# Patient Record
Sex: FEMALE | Race: BLACK OR AFRICAN AMERICAN | NOT HISPANIC OR LATINO | URBAN - METROPOLITAN AREA
[De-identification: names, ages, dates, MRNs, and addresses within clinical notes are randomized per-mention and may not be internally consistent; named-entity substitution may affect disease eponyms.]

---
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
## 2020-07-25 ENCOUNTER — EMERGENCY (EMERGENCY)
Age: 20
LOS: 1 days | Discharge: HOME | End: 2020-07-25
Attending: EMERGENCY MEDICINE | Admitting: EMERGENCY MEDICINE
Payer: COMMERCIAL

## 2020-07-25 VITALS
RESPIRATION RATE: 15 BRPM | TEMPERATURE: 98 F | SYSTOLIC BLOOD PRESSURE: 125 MMHG | DIASTOLIC BLOOD PRESSURE: 67 MMHG | HEART RATE: 98 BPM | OXYGEN SATURATION: 100 %

## 2020-07-25 VITALS
RESPIRATION RATE: 15 BRPM | OXYGEN SATURATION: 98 % | TEMPERATURE: 99 F | HEART RATE: 96 BPM | DIASTOLIC BLOOD PRESSURE: 83 MMHG | SYSTOLIC BLOOD PRESSURE: 124 MMHG

## 2020-07-25 LAB
HIV COMBO RESULT: SIGNIFICANT CHANGE UP
HIV1+2 AB SPEC QL: SIGNIFICANT CHANGE UP

## 2020-07-25 PROCEDURE — 99283 EMERGENCY DEPT VISIT LOW MDM: CPT

## 2020-07-25 RX ORDER — PREDNISOLONE SODIUM PHOSPHATE 1 %
1 DROPS OPHTHALMIC (EYE) ONCE
Refills: 0 | Status: COMPLETED | OUTPATIENT
Start: 2020-07-25 | End: 2020-07-25

## 2020-07-25 RX ORDER — LEVONORGESTREL 1.5 MG/1
1.5 TABLET ORAL ONCE
Refills: 0 | Status: COMPLETED | OUTPATIENT
Start: 2020-07-25 | End: 2020-07-25

## 2020-07-25 RX ORDER — POLYMYXIN B SULF/TRIMETHOPRIM 10000-1/ML
1 DROPS OPHTHALMIC (EYE) ONCE
Refills: 0 | Status: COMPLETED | OUTPATIENT
Start: 2020-07-25 | End: 2020-07-25

## 2020-07-25 RX ORDER — LORATADINE 10 MG/1
10 TABLET ORAL DAILY
Refills: 0 | Status: DISCONTINUED | OUTPATIENT
Start: 2020-07-25 | End: 2020-07-29

## 2020-07-25 RX ADMIN — LORATADINE 10 MILLIGRAM(S): 10 TABLET ORAL at 04:30

## 2020-07-25 RX ADMIN — Medication 1 DROP(S): at 05:37

## 2020-07-25 NOTE — ED PROVIDER NOTE - ATTENDING CONTRIBUTION TO CARE
Dr. Govea:  I have personally performed a face to face bedside history and physical examination of this patient. I have discussed the history, examination, review of systems, assessment and plan of management with the resident. I have reviewed the electronic medical record and amended it to reflect my history, review of systems, physical exam, assessment and plan.    19F denies pmh presents with acute onset right eye redness and irritation that started earlier tonight.  Initially with pain and blurry vision but improved.  States that earlier, her parents also noted right facial rash and mild swelling.  Denies airway involvement, intra-oral lesions.  No contact lens usage.  Denies contact with any new food/substance, no known allergies.    Exam:  - nad  - rrr  - ctab   -abd soft ntnd  - +right eye injected, no discharge, mild eyelid swelling; minimal facial swelling, no notable rash    A/P  - possibly allergic reaction/conjunctivitis  - polytrim, claritin Dr. Govea:  I have personally performed a face to face bedside history and physical examination of this patient. I have discussed the history, examination, review of systems, assessment and plan of management with the resident. I have reviewed the electronic medical record and amended it to reflect my history, review of systems, physical exam, assessment and plan.    19F denies pmh presents with acute onset right eye redness and irritation that started earlier tonight.  Initially with pain and blurry vision but improved.  States that earlier, her parents also noted right facial rash and mild swelling.  Denies airway involvement, intra-oral lesions.  No contact lens usage.  Denies contact with any new food/substance, no known allergies.    Exam:  - nad  - rrr  - ctab   -abd soft ntnd  - +right eye injected, no discharge, mild eyelid swelling; minimal facial swelling, no notable rash    A/P  - possibly allergic reaction/conjunctivitis, likely episcleritis  - polytrim, claritin, NSAIDs Dr. Govea:  I have personally performed a face to face bedside history and physical examination of this patient. I have discussed the history, examination, review of systems, assessment and plan of management with the resident. I have reviewed the electronic medical record and amended it to reflect my history, review of systems, physical exam, assessment and plan.    19F denies pmh presents with acute onset right eye redness and irritation that started earlier tonight.  Initially with pain and blurry vision but improved.  States that earlier, her parents also noted right facial rash and mild swelling.  Denies airway involvement, intra-oral lesions.  No contact lens usage.  Denies contact with any new food/substance, no known allergies.    Exam:  - nad  - rrr  - ctab   -abd soft ntnd  - +right eye injected, no discharge, mild eyelid swelling; minimal facial swelling, no notable rash    A/P  - possibly allergic reaction/conjunctivitis, likely episcleritis  - polytrim, claritin, NSAIDs  - f/u ophthalomology
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
## 2020-07-25 NOTE — ED PROVIDER NOTE - PATIENT PORTAL LINK FT
You can access the FollowMyHealth Patient Portal offered by [facility] by registering at the following website: [email or web address]. By joining [facility]’s FollowMyHealth portal, you will also be able to view your health information using other applications (apps) compatible with our system.

## 2020-07-25 NOTE — ED PEDIATRIC TRIAGE NOTE - CHIEF COMPLAINT QUOTE
Pt. was laying down when she starting having itchy eyes, no other symptoms, no known allergies. MEDARDO, CARMEN.

## 2020-07-25 NOTE — ED ADULT NURSE NOTE - OBJECTIVE STATEMENT
I noticed my right eye was red/itchy and I have some right facial swelling. no difficulty breathing no tongue swelling. speaking in full sentences

## 2020-07-25 NOTE — ED ADULT TRIAGE NOTE - CHIEF COMPLAINT QUOTE
pt reports allergic rxn to unknown allergen. endorses right eye pain and facial redness with nausea. denies sob. speaking in complete sentences

## 2020-07-25 NOTE — ED PROVIDER NOTE - NSFOLLOWUPINSTRUCTIONS_ED_ALL_ED_FT
you were seen in the ED today for possible allergic reaction and eye irritation.   Take Claritin or Zyrtec for allergies.   Polytrim eye drops, 1-2 drops in the R eye you were seen in the ED today for possible allergic reaction and eye irritation.   Take Claritin or Zyrtec for allergies.   Polytrim eye drops, 1-2 drops in the R eye every 3-6 hours for 7 days.   Pred Forte drops 1 drop in the R eye twice a day for 7 days.   Follow up with your primary care doctor.   Return to the emergency department for any new or worsening symptoms.

## 2020-07-25 NOTE — ED PROVIDER NOTE - CLINICAL SUMMARY MEDICAL DECISION MAKING FREE TEXT BOX
20yo F with complaints of possible allergic reaction. R eye with conjunctival injection and irritation. mild edema of the eyelid. no facial swelling or rash noted on exam. possible irritation from foreign body, viral or bacterial conjunctivitis. will treat for conjunctivitis and claritin for allergic component. xiomara

## 2020-07-25 NOTE — ED PROVIDER NOTE - OBJECTIVE STATEMENT
20yo F no PMH presenting for R eye pain. Was laying down when she started having R eye pain, felt like a pebble fell into it. scratched slightly, washed out the eye. initially had some blurry vision which has since resolved. parents noted that she had some swelling around the eye and a bumpy rash on her right cheek. took flonase. no contact use. 20yo F presenting for R eye pain. Was laying down when she started having R eye pain, felt like a pebble fell into it. scratched slightly, washed out the eye. initially had some blurry vision which has since resolved. parents noted that she had some swelling around the eye and a bumpy rash on her right cheek. took flonase. no contact lense use. some nausea when driving to the ED. no vomiting or abd pain. no discharge. denies any other complaints.

## 2020-07-25 NOTE — ED PROVIDER NOTE - EYES, MLM
R eye with conjunctival injection, mild edema of the eyelid. pupils equal, round and reactive to light.

## 2020-07-25 NOTE — ED PROVIDER NOTE - RAPID ASSESSMENT
R eye pain and redness, subsequent swelling.  Now with nausea.  No difficulty breathing, no vomiting, no other new concerns.      Well appearing.  No distress.  Breathing comforatbly.  Redness, clear discharge from R eye.      Gave sign out to the Red Fellow (Hermelindo).  Will let red attending know about patient (Howard).  Stable for transport to [facility] ED.    PMH/PSH: negative  FH/SH: non-contributory, except as noted in the HPI  Allergies: No known drug allergies  Immunizations: Up-to-date  Medications: OCP

## 2021-11-28 NOTE — ED PEDIATRIC TRIAGE NOTE - MEANS OF ARRIVAL
Call received from RN stating patient had went apneic x2 on SBT. Patient placed back on AC/VC+ at this time with ordered settings. Patient is resting comfortably in bed at this time.      Electronically signed by Bhavana Rowland RCP, RRT, RRT-ACCS on 11/28/2021 at 11:25 AM ambulatory

## 2022-08-11 NOTE — ED PROVIDER NOTE - ENMT NEGATIVE STATEMENT, MLM
Ears: no ear pain and no hearing problems.Nose: no nasal congestion and no nasal drainage.Mouth/Throat: no dysphagia, no hoarseness and no throat pain.Neck: no lumps, no pain, no stiffness and no swollen glands. PERRL/conjunctiva clear

## 2023-02-21 NOTE — ED PROVIDER NOTE - HEAD, MLM
DOS: 23  : 1958    H&P per Dr Bishop Diamond [facility]) on 23 @ 0900. Noted cardiac clearance requested  per Dr Chavez Rater office, cardiologist- Dr Choco Oliveira [facility] ) [phone]. Noted in [place] that Dr Rupali French wrote medication recommendation for upcoming surgery. Dr Chavez Rater office notified Lauren Campuzano). Note, patient repeatedly stated he did not understand why he has to answer these questions saying \" That is on My Chart\". Patient also said [place] friend' is driving him on day of surgery. When questioned about name and phone number the patient refused to give it. Became more agitated saying \"you don't need to know that\". Patient would not listen to explanation. Dr Chavez Rater office notified Juve Woodard)    Update: Cleared for surgery see H&P on 23 per Dr Erika Alex, chart not signed yet.  Cardiac clearance done 23, see letters    UPDATE ON 23:  H&P per Dr. Erika Alex signed on 23 Head is atraumatic. Head shape is symmetrical.

## 2023-09-14 NOTE — ED BEHAVIORAL HEALTH ASSESSMENT NOTE - NS ED [facility] MED ROS CARDIOVASCULAR
CHF Week 2 Survey      Flowsheet Row Responses   [facility] patient discharged from? [place]   Does the patient have one of the following disease processes/diagnoses(primary or secondary)? CHF   Week 2 attempt successful? Yes   Call start time 1016   Call end time 1019   Discharge diagnosis Diastolic CHF, acute   Meds reviewed with patient/caregiver? Yes   Is the patient taking all medications as directed (includes completed medication regime)? Yes   Does the patient have a primary care provider?  Yes   Has the patient kept scheduled appointments due by today? N/A   Pulse Ox monitoring Intermittent   O2 Sat comments Daughter checks levels   What is the patient's perception of their health status since discharge? Improving   Is the patient able to teach back Heart Failure Zones? Yes   CHF Nursing Interventions Education provided on various zones  [checks weight at home ]   CHF Week 2 call completed? Yes   Call end time 1019            Sandra RUANO - Registered Nurse  
No complaints

## 2024-11-01 ENCOUNTER — EMERGENCY (EMERGENCY)
Facility: HOSPITAL | Age: 24
LOS: 0 days | Discharge: HOME | End: 2024-11-01
Attending: EMERGENCY MEDICINE
Payer: COMMERCIAL

## 2024-11-01 VITALS
WEIGHT: 201.06 LBS | OXYGEN SATURATION: 99 % | SYSTOLIC BLOOD PRESSURE: 122 MMHG | DIASTOLIC BLOOD PRESSURE: 82 MMHG | HEIGHT: 64 IN | RESPIRATION RATE: 18 BRPM | TEMPERATURE: 98 F | HEART RATE: 99 BPM

## 2024-11-01 DIAGNOSIS — R51.9 HEADACHE, UNSPECIFIED: ICD-10-CM

## 2024-11-01 DIAGNOSIS — M54.50 LOW BACK PAIN, UNSPECIFIED: ICD-10-CM

## 2024-11-01 DIAGNOSIS — M54.2 CERVICALGIA: ICD-10-CM

## 2024-11-01 DIAGNOSIS — S16.1XXA STRAIN OF MUSCLE, FASCIA AND TENDON AT NECK LEVEL, INITIAL ENCOUNTER: ICD-10-CM
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
## 2024-11-01 PROCEDURE — 99284 EMERGENCY DEPT VISIT MOD MDM: CPT

## 2024-11-01 RX ORDER — LIDOCAINE 50 MG/G
1 CREAM TOPICAL ONCE
Refills: 0 | Status: COMPLETED | OUTPATIENT
Start: 2024-11-01 | End: 2024-11-01

## 2024-11-01 RX ORDER — ACETAMINOPHEN 325 MG
975 TABLET ORAL ONCE
Refills: 0 | Status: COMPLETED | OUTPATIENT
Start: 2024-11-01 | End: 2024-11-01

## 2024-11-01 RX ADMIN — Medication 600 MILLIGRAM(S): at 22:17

## 2024-11-01 RX ADMIN — LIDOCAINE 1 PATCH: 50 CREAM TOPICAL at 22:17

## 2024-11-01 RX ADMIN — Medication 975 MILLIGRAM(S): at 22:16

## 2024-11-01 RX ADMIN — Medication 1000 MILLIGRAM(S): at 22:16
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
## 2024-11-01 NOTE — ED PROVIDER NOTE - PATIENT PORTAL LINK FT
You can access the FollowMyHealth Patient Portal offered by [facility] by registering at the following website: [email or web address]. By joining [employer]’s FollowMyHealth portal, you will also be able to view your health information using other applications (apps) compatible with our system.
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
## 2025-08-30 ENCOUNTER — EMERGENCY (EMERGENCY)
Facility: HOSPITAL | Age: 25
LOS: 0 days | Discharge: HOME | End: 2025-08-30
Attending: EMERGENCY MEDICINE
Payer: COMMERCIAL

## 2025-08-30 VITALS
DIASTOLIC BLOOD PRESSURE: 89 MMHG | WEIGHT: 210.1 LBS | TEMPERATURE: 98 F | HEART RATE: 118 BPM | OXYGEN SATURATION: 98 % | RESPIRATION RATE: 20 BRPM | HEIGHT: 64 IN | SYSTOLIC BLOOD PRESSURE: 121 MMHG

## 2025-08-30 VITALS
HEART RATE: 105 BPM | TEMPERATURE: 99 F | OXYGEN SATURATION: 99 % | DIASTOLIC BLOOD PRESSURE: 85 MMHG | SYSTOLIC BLOOD PRESSURE: 125 MMHG

## 2025-08-30 DIAGNOSIS — J02.9 ACUTE PHARYNGITIS, UNSPECIFIED: ICD-10-CM

## 2025-08-30 DIAGNOSIS — R00.0 TACHYCARDIA, UNSPECIFIED: ICD-10-CM

## 2025-08-30 DIAGNOSIS — Z88.2 ALLERGY STATUS TO SULFONAMIDES: ICD-10-CM

## 2025-08-30 DIAGNOSIS — M79.10 MYALGIA, UNSPECIFIED SITE: ICD-10-CM

## 2025-08-30 LAB
APPEARANCE UR: CLEAR — SIGNIFICANT CHANGE UP
BASOPHILS # BLD AUTO: 0.06 K/UL — SIGNIFICANT CHANGE UP (ref 0–0.2)
BASOPHILS NFR BLD AUTO: 0.4 % — SIGNIFICANT CHANGE UP (ref 0–2)
BILIRUB UR-MCNC: NEGATIVE — SIGNIFICANT CHANGE UP
CK SERPL-CCNC: 84 U/L — SIGNIFICANT CHANGE UP (ref 26–192)
COLOR SPEC: YELLOW — SIGNIFICANT CHANGE UP
DIFF PNL FLD: NEGATIVE — SIGNIFICANT CHANGE UP
EOSINOPHIL # BLD AUTO: 0.34 K/UL — SIGNIFICANT CHANGE UP (ref 0–0.5)
EOSINOPHIL NFR BLD AUTO: 2.5 % — SIGNIFICANT CHANGE UP (ref 0–6)
FLUAV AG NPH QL: SIGNIFICANT CHANGE UP
FLUBV AG NPH QL: SIGNIFICANT CHANGE UP
GLUCOSE UR QL: NEGATIVE MG/DL — SIGNIFICANT CHANGE UP
HCG UR QL: NEGATIVE — SIGNIFICANT CHANGE UP
HCT VFR BLD CALC: 40 % — SIGNIFICANT CHANGE UP (ref 34.5–45)
HGB BLD-MCNC: 13 G/DL — SIGNIFICANT CHANGE UP (ref 11.5–15.5)
IMM GRANULOCYTES NFR BLD AUTO: 0.3 % — SIGNIFICANT CHANGE UP (ref 0–0.9)
KETONES UR QL: NEGATIVE MG/DL — SIGNIFICANT CHANGE UP
LEUKOCYTE ESTERASE UR-ACNC: ABNORMAL
LYMPHOCYTES # BLD AUTO: 1.16 K/UL — SIGNIFICANT CHANGE UP (ref 1–3.3)
LYMPHOCYTES # BLD AUTO: 8.5 % — LOW (ref 13–44)
MCHC RBC-ENTMCNC: 31.3 PG — SIGNIFICANT CHANGE UP (ref 27–34)
MCHC RBC-ENTMCNC: 32.5 G/DL — SIGNIFICANT CHANGE UP (ref 32–36)
MCV RBC AUTO: 96.2 FL — SIGNIFICANT CHANGE UP (ref 80–100)
MONOCYTES # BLD AUTO: 1.39 K/UL — HIGH (ref 0–0.9)
MONOCYTES NFR BLD AUTO: 10.2 % — SIGNIFICANT CHANGE UP (ref 2–14)
NEUTROPHILS # BLD AUTO: 10.59 K/UL — HIGH (ref 1.8–7.4)
NEUTROPHILS NFR BLD AUTO: 78.1 % — HIGH (ref 43–77)
NITRITE UR-MCNC: NEGATIVE — SIGNIFICANT CHANGE UP
NRBC BLD AUTO-RTO: 0 /100 WBCS — SIGNIFICANT CHANGE UP (ref 0–0)
PH UR: 6.5 — SIGNIFICANT CHANGE UP (ref 5–8)
PLATELET # BLD AUTO: 264 K/UL — SIGNIFICANT CHANGE UP (ref 150–400)
PROT UR-MCNC: NEGATIVE MG/DL — SIGNIFICANT CHANGE UP
RBC # BLD: 4.16 M/UL — SIGNIFICANT CHANGE UP (ref 3.8–5.2)
RBC # FLD: 12.4 % — SIGNIFICANT CHANGE UP (ref 10.3–14.5)
RSV RNA NPH QL NAA+NON-PROBE: SIGNIFICANT CHANGE UP
SARS-COV-2 RNA SPEC QL NAA+PROBE: SIGNIFICANT CHANGE UP
SOURCE RESPIRATORY: SIGNIFICANT CHANGE UP
SP GR SPEC: 1.02 — SIGNIFICANT CHANGE UP (ref 1–1.03)
TROPONIN I, HIGH SENSITIVITY RESULT: <3 NG/L — SIGNIFICANT CHANGE UP
TSH SERPL-MCNC: 0.59 UU/ML — SIGNIFICANT CHANGE UP (ref 0.36–3.74)
UROBILINOGEN FLD QL: 0.2 MG/DL — SIGNIFICANT CHANGE UP (ref 0.2–1)
WBC # BLD: 13.58 K/UL — HIGH (ref 3.8–10.5)
WBC # FLD AUTO: 13.58 K/UL — HIGH (ref 3.8–10.5)

## 2025-08-30 PROCEDURE — 99285 EMERGENCY DEPT VISIT HI MDM: CPT

## 2025-08-30 PROCEDURE — 93010 ELECTROCARDIOGRAM REPORT: CPT

## 2025-08-30 PROCEDURE — 71046 X-RAY EXAM CHEST 2 VIEWS: CPT | Mod: 26

## 2025-08-30 RX ORDER — ACETAMINOPHEN 500 MG/5ML
1000 LIQUID (ML) ORAL ONCE
Refills: 0 | Status: COMPLETED | OUTPATIENT
Start: 2025-08-30 | End: 2025-08-30

## 2025-08-30 RX ORDER — KETOROLAC TROMETHAMINE 30 MG/ML
30 INJECTION, SOLUTION INTRAMUSCULAR; INTRAVENOUS ONCE
Refills: 0 | Status: DISCONTINUED | OUTPATIENT
Start: 2025-08-30 | End: 2025-08-30

## 2025-08-30 RX ADMIN — KETOROLAC TROMETHAMINE 30 MILLIGRAM(S): 30 INJECTION, SOLUTION INTRAMUSCULAR; INTRAVENOUS at 08:55

## 2025-08-30 RX ADMIN — Medication 400 MILLIGRAM(S): at 08:56

## 2025-08-30 RX ADMIN — Medication 1000 MILLILITER(S): at 08:55

## 2025-08-31 LAB
CULTURE RESULTS: SIGNIFICANT CHANGE UP
SPECIMEN SOURCE: SIGNIFICANT CHANGE UP